# Patient Record
Sex: MALE | Race: BLACK OR AFRICAN AMERICAN | NOT HISPANIC OR LATINO | URBAN - NONMETROPOLITAN AREA
[De-identification: names, ages, dates, MRNs, and addresses within clinical notes are randomized per-mention and may not be internally consistent; named-entity substitution may affect disease eponyms.]

---

## 2024-07-02 ENCOUNTER — OFFICE (OUTPATIENT)
Dept: URBAN - NONMETROPOLITAN AREA CLINIC 5 | Facility: CLINIC | Age: 29
End: 2024-07-02

## 2024-07-02 VITALS
HEART RATE: 86 BPM | RESPIRATION RATE: 19 BRPM | WEIGHT: 192 LBS | HEIGHT: 65 IN | DIASTOLIC BLOOD PRESSURE: 80 MMHG | SYSTOLIC BLOOD PRESSURE: 152 MMHG

## 2024-07-02 DIAGNOSIS — A04.8 OTHER SPECIFIED BACTERIAL INTESTINAL INFECTIONS: ICD-10-CM

## 2024-07-02 PROCEDURE — 99203 OFFICE O/P NEW LOW 30 MIN: CPT | Performed by: INTERNAL MEDICINE

## 2024-07-02 NOTE — SERVICEHPINOTES
Papito Shelby   is a   30 yo  male   with no significant past medical history who presents to establish care after recent diagnosis of H.pylori.  Patient was recently seen at the urgent care reporting abdominal pain.  He completed H.pylori blood testing which was positive.  He was treated with a course of clarithromycin, amoxicillin, and omeprazole.  He presents now to establish care.  He reports he is doing well.  He denies any nausea, vomiting, dysphagia, odynophagia, diarrhea, constipation, bright red blood per rectum, melena, or family history colorectal cancer.  He works for the [employer] doing [employer].  He completed his antibiotics several weeks ago. .  He has not had H.pylori stool antigen testing to ensure eradication of infection.

## 2024-07-02 NOTE — SERVICENOTES
Given patient's recent H.pylori testing which was positive  in aclinnic and now that he is status post a course of antibiotics will plan for stool antigen to ensure eradication of infection.  Otherwise will plan to see back as needed.